# Patient Record
(demographics unavailable — no encounter records)

---

## 2024-10-26 NOTE — DISCUSSION/SUMMARY
[de-identified] : X-rays reviewed with the patient.  All questions answered about her x-rays, osteoarthritis in the knee.  Her knee is stable on physical examination.  Continue with conservative management and observation.  Anti-inflammatories sent to her pharmacy to use as directed.  A Medrol Dosepak prescribed as well as meloxicam 15 mg to take after the Medrol Dosepak if she continues to have pain.  Continue with her over-the-counter ankle brace for sleeve.  Activity modifications.  Physical therapy prescription given for a left knee strain and conditioning program.  Hold off on additional imaging.  If the knee pain does not subside in 4 to 6 weeks, the patient will follow-up with our knee specialist.  We briefly spoke about a left knee cortisone injection but the patient would like to hold off on injections at this time.  All of her questions were answered.  She understood and agreed to the treatment course.  The patient's current medication management of their orthopedic diagnosis was reviewed today: (1) We discussed a comprehensive treatment plan that included possible pharmaceutical management involving the use of prescription strength medications including but not limited to options such as oral Ibuprofen 400mg QID, once daily Meloxicam 15 mg, or 500-650 mg Tylenol versus over the counter oral medications and topical prescription NSAID Pennsaid vs over the counter Voltaren gel. (2) There is a moderate risk of morbidity with further treatment, especially from use of prescription strength medications and possible side effects of these medications which include upset stomach with oral medications, skin reactions to topical medications and cardiac/renal issues with long term use. (3) I recommended that the patient follow-up with their medical physician to discuss any significant specific potential issues with long term medication use such as interactions with current medications or with exacerbation of underlying medical comorbidities. (4) The benefits and risks associated with use of injectable, oral or topical, prescription and over the counter anti-inflammatory medications were discussed with the patient. The patient voiced understanding of the risks including but not limited to bleeding, stroke, kidney dysfunction, heart disease, and were referred to the black box warning label for further information.

## 2024-10-26 NOTE — PHYSICAL EXAM
[de-identified] : Left knee Physical Examination:  General: Alert and oriented x3.  In no acute distress.  Pleasant in nature with a normal affect.  No apparent respiratory distress.   Erythema, Warmth, Rubor: Negative Swelling/Edema: Negative ROM: 0-130 degrees Arun's Test: Negative  Medial Joint Line TTP: + Lateral Joint Line TTP: Negative  Lachman Exam/Anterior Drawer/Pivot Shift Test: Negative  MCL Pain: Negative LCL Pain: Negative Iliotibial Band Pain: Negative Patellofemoral Joint Pain: Negative Patellar Tendon TTP: Negative Pes Anserinus TTP: Negative Homans Sign: Negative Posterior Knee Pain: Negative Neuro: Intact with no sensory or motor deficits [de-identified] : 3 views x-rays taken of the left knee in office, reviewed on 10/26/2024 revealed: No fractures present.  2 metal screws seen in the distal femur and proximal tibia, history of ACL repair.  Arthritic changes seen throughout the knee.